# Patient Record
Sex: FEMALE | Race: WHITE | NOT HISPANIC OR LATINO | Employment: OTHER | URBAN - METROPOLITAN AREA
[De-identification: names, ages, dates, MRNs, and addresses within clinical notes are randomized per-mention and may not be internally consistent; named-entity substitution may affect disease eponyms.]

---

## 2017-10-02 ENCOUNTER — APPOINTMENT (OUTPATIENT)
Dept: LAB | Facility: HOSPITAL | Age: 72
End: 2017-10-02
Attending: UROLOGY
Payer: MEDICARE

## 2017-10-02 ENCOUNTER — ALLSCRIPTS OFFICE VISIT (OUTPATIENT)
Dept: OTHER | Facility: OTHER | Age: 72
End: 2017-10-02

## 2017-10-02 ENCOUNTER — TRANSCRIBE ORDERS (OUTPATIENT)
Dept: LAB | Facility: HOSPITAL | Age: 72
End: 2017-10-02

## 2017-10-02 DIAGNOSIS — C67.4 MALIGNANT NEOPLASM OF POSTERIOR WALL OF URINARY BLADDER (HCC): Primary | ICD-10-CM

## 2017-10-02 LAB
BILIRUB UR QL STRIP: NEGATIVE
CLARITY UR: NORMAL
COLOR UR: YELLOW
GLUCOSE (HISTORICAL): NEGATIVE
HGB UR QL STRIP.AUTO: NEGATIVE
KETONES UR STRIP-MCNC: NEGATIVE MG/DL
LEUKOCYTE ESTERASE UR QL STRIP: NORMAL
NITRITE UR QL STRIP: NEGATIVE
PH UR STRIP.AUTO: 7 [PH]
PROT UR STRIP-MCNC: NEGATIVE MG/DL
SP GR UR STRIP.AUTO: < = 1.005
UROBILINOGEN UR QL STRIP.AUTO: 0.2

## 2017-10-02 PROCEDURE — 88112 CYTOPATH CELL ENHANCE TECH: CPT

## 2017-10-06 ENCOUNTER — GENERIC CONVERSION - ENCOUNTER (OUTPATIENT)
Dept: OTHER | Facility: OTHER | Age: 72
End: 2017-10-06

## 2018-01-17 NOTE — RESULT NOTES
Verified Results  (1) URINE CYTOLOGY [id number] 08:10PM Hilary Amend     Test Name Result Flag Reference   LAB AP CASE REPORT (Report)     Non-gynecologic Cytology             Case: [id number]                  Authorizing Provider: Thaddeus Dancer, MD   Collected:      10/02/2017 2010        Ordering Location:   [address] [place] [address]   Received:      10/02/2017 2038                    [facility]                              Pathologist:      Bob Bullard MD                             Specimen:  Urine, Other   LAB AP CYTO FINAL DIAGNOSIS (Report)     A  Urine, Other, :  Negative for high grade urothelial carcinoma ([address]) - see comment  Benign urothelial cells  Benign squamous cells  Satisfactory for evaluation  Comment: The above diagnostic category is from the recently published   book, [facility] [place] [facility] Urinary Cytology, and is in keeping   with the ongoing effort for utilization of standardized diagnostic   terminology in urine cytology  *    *[facility] [place] [facility] Urinary Cytology  Nadeen Rosales; 2016  Electronically signed by Bob Bullard MD on 10/4/2017 at 9:27 AM   LAB AP GROSS DESCRIPTION      A  Urine, Other, : 50ml, yellow, clear, received in [facility]   LAB AP NONGYN ADDITIONAL INFORMATION (Report)     [employer] [place]'s FDA approved ,  and ThinPrep Imaging System are   utilized with strict adherence to the 's instruction manual to   prepare gynecologic and non-gynecologic cytology specimens for the   production of ThinPrep slides as well as for gynecologic ThinPrep imaging  These processes have been validated by our laboratory and/or by the     These tests were developed and their performance characteristics   determined by [facility] [address] [place] [address] or [employer]  They may not be cleared or approved by the [place]  Food and   Drug Administration   The FDA has determined that such clearance or   approval is not necessary  These tests are used for clinical purposes  They should not be regarded as investigational or for research  This   laboratory has been approved by CLIA 88, designated as a high-complexity   laboratory and is qualified to perform these tests     LAB AP NONGYN NOTE      Interpretation performed at [facility], [address] [place] [address]   [id number]

## 2018-01-22 VITALS
SYSTOLIC BLOOD PRESSURE: 144 MMHG | WEIGHT: 178 LBS | DIASTOLIC BLOOD PRESSURE: 82 MMHG | BODY MASS INDEX: 29.66 KG/M2 | HEIGHT: 65 IN

## 2018-10-10 ENCOUNTER — PROCEDURE VISIT (OUTPATIENT)
Dept: UROLOGY | Facility: MEDICAL CENTER | Age: 73
End: 2018-10-10
Payer: MEDICARE

## 2018-10-10 VITALS
HEIGHT: 65 IN | WEIGHT: 171 LBS | BODY MASS INDEX: 28.49 KG/M2 | DIASTOLIC BLOOD PRESSURE: 74 MMHG | SYSTOLIC BLOOD PRESSURE: 128 MMHG

## 2018-10-10 DIAGNOSIS — Z85.51 HISTORY OF MALIGNANT NEOPLASM OF BLADDER: Primary | ICD-10-CM

## 2018-10-10 PROCEDURE — 52000 CYSTOURETHROSCOPY: CPT | Performed by: UROLOGY

## 2018-10-10 RX ORDER — METOPROLOL SUCCINATE 25 MG/1
25 TABLET, EXTENDED RELEASE ORAL DAILY
Refills: 3 | COMMUNITY
Start: 2018-08-21

## 2018-10-10 RX ORDER — ATORVASTATIN CALCIUM 10 MG/1
TABLET, FILM COATED ORAL DAILY
Refills: 1 | COMMUNITY
Start: 2018-07-17 | End: 2019-10-15 | Stop reason: SDDI

## 2018-10-10 RX ORDER — ENALAPRIL MALEATE 10 MG/1
20 TABLET ORAL DAILY
COMMUNITY

## 2018-10-10 RX ORDER — BIOTIN 1 MG
TABLET ORAL DAILY
COMMUNITY

## 2018-10-10 RX ORDER — LEVOTHYROXINE SODIUM 0.05 MG/1
50 TABLET ORAL DAILY
Refills: 3 | COMMUNITY
Start: 2018-07-31

## 2018-10-10 NOTE — ASSESSMENT & PLAN NOTE
The patient's last tumor was 5 years ago  She has no recurrence  She will have annual cystoscopies for the next 5 years

## 2018-10-10 NOTE — PROGRESS NOTES
Procedures  FEMALE FLEXIBLE CYSTOSCOPY    Preoperative diagnosis:  History of bladder cancer    Postoperative diagnosis:   No recurrence      Operation:   cystoscopy    Surgeon:  Garrick Pelletier MD, FACS    Anesthesia:  Local    Procedure:  Patient was brought to the cystoscopy room and positively identified  The patient was prepped and draped in sterile fashion  1% xylocaine jelly was instilled into the urethra  The cystoscope was inserted  Findings are as follows:    Urethra:normal   The bladder neck is normal  Ureteral orifices are in normal  position  The mucosa is  normal    There is  no trabeculation  The cystoscope was removed  The patient tolerated the procedure well  Following additional consultation to review the findings with the patient,s he was discharged from the office in satisfactory condition  Impression:  Normal exam    Plan:   Repeat cystoscopy in 1 year

## 2019-10-15 ENCOUNTER — PROCEDURE VISIT (OUTPATIENT)
Dept: UROLOGY | Facility: MEDICAL CENTER | Age: 74
End: 2019-10-15
Payer: MEDICARE

## 2019-10-15 VITALS
DIASTOLIC BLOOD PRESSURE: 80 MMHG | SYSTOLIC BLOOD PRESSURE: 128 MMHG | HEART RATE: 87 BPM | HEIGHT: 65 IN | WEIGHT: 174 LBS | BODY MASS INDEX: 28.99 KG/M2

## 2019-10-15 DIAGNOSIS — Z85.51 HISTORY OF MALIGNANT NEOPLASM OF BLADDER: ICD-10-CM

## 2019-10-15 PROCEDURE — 52000 CYSTOURETHROSCOPY: CPT | Performed by: UROLOGY

## 2019-10-15 NOTE — PROGRESS NOTES
Procedures  FEMALE RIGID CYSTOSCOPY    Preoperative diagnosis:  History of bladder cancer in 2013    Postoperative diagnosis:  Normal cystoscopy  No recurrence  Operation:  Rigid cystoscopy    Surgeon:  Regis Woodruff MD, FACS    Anesthesia:  Local    Procedure:  Patient was brought to the cystoscopy room and positively identified  The patient was prepped and draped in sterile fashion  1% xylocaine jelly was instilled into the urethra  The cystoscope was inserted  Findings are as follows:    Urethra:normal   The bladder neck is normal  Ureteral orifices are in normal  position  The mucosa is normal    There is no trabeculation  No recurrent neoplasm    The cystoscope was removed  The patient tolerated the procedure well  Following additional consultation to review the findings with the patient,s he was discharged from the office in satisfactory condition  Impression:  Normal exam    Plan:  Repeat cystoscopy in 1 year

## 2019-10-15 NOTE — LETTER
October 15, 2019     [facility]  [address]  [id number]    Patient: Kate Kc   YOB: 1945   Date of Visit: 10/15/2019       Dear Dr Isauro Dahl: Thank you for referring Joi Gastelum to me for evaluation  Below are my notes for this consultation  If you have questions, please do not hesitate to call me  I look forward to following your patient along with you  Sincerely,        Danny Ortega MD        CC: No Recipients  Danny Ortega MD  10/15/2019 11:01 AM  Incomplete  Procedures  FEMALE RIGID CYSTOSCOPY    Preoperative diagnosis:  History of bladder cancer in 2013    Postoperative diagnosis:  Normal cystoscopy  No recurrence  Operation:  Rigid cystoscopy    Surgeon:  Goyo Mondragon MD, FACS    Anesthesia:  Local    Procedure:  Patient was brought to the cystoscopy room and positively identified  The patient was prepped and draped in sterile fashion  1% xylocaine jelly was instilled into the urethra  The cystoscope was inserted  Findings are as follows:    Urethra:normal   The bladder neck is normal  Ureteral orifices are in normal  position  The mucosa is normal    There is no trabeculation  No recurrent neoplasm    The cystoscope was removed  The patient tolerated the procedure well  Following additional consultation to review the findings with the patient,s he was discharged from the office in satisfactory condition  Impression:  Normal exam    Plan:  Repeat cystoscopy in 1 year

## 2020-10-20 ENCOUNTER — PROCEDURE VISIT (OUTPATIENT)
Dept: UROLOGY | Facility: MEDICAL CENTER | Age: 75
End: 2020-10-20
Payer: MEDICARE

## 2020-10-20 VITALS
TEMPERATURE: 97.5 F | HEART RATE: 97 BPM | HEIGHT: 64 IN | DIASTOLIC BLOOD PRESSURE: 86 MMHG | WEIGHT: 176 LBS | BODY MASS INDEX: 30.05 KG/M2 | SYSTOLIC BLOOD PRESSURE: 144 MMHG

## 2020-10-20 DIAGNOSIS — Z85.51 HISTORY OF MALIGNANT NEOPLASM OF BLADDER: Primary | ICD-10-CM

## 2020-10-20 PROCEDURE — 52000 CYSTOURETHROSCOPY: CPT | Performed by: UROLOGY

## 2022-01-25 ENCOUNTER — PROCEDURE VISIT (OUTPATIENT)
Dept: UROLOGY | Facility: MEDICAL CENTER | Age: 77
End: 2022-01-25
Payer: MEDICARE

## 2022-01-25 VITALS
BODY MASS INDEX: 25.83 KG/M2 | DIASTOLIC BLOOD PRESSURE: 100 MMHG | WEIGHT: 155 LBS | HEART RATE: 87 BPM | HEIGHT: 65 IN | SYSTOLIC BLOOD PRESSURE: 160 MMHG

## 2022-01-25 DIAGNOSIS — Z85.51 HISTORY OF MALIGNANT NEOPLASM OF BLADDER: Primary | ICD-10-CM

## 2022-01-25 PROCEDURE — 99213 OFFICE O/P EST LOW 20 MIN: CPT | Performed by: UROLOGY

## 2022-01-25 PROCEDURE — 52000 CYSTOURETHROSCOPY: CPT | Performed by: UROLOGY

## 2022-01-25 RX ORDER — MECLIZINE HYDROCHLORIDE 25 MG/1
25 TABLET ORAL 3 TIMES DAILY PRN
COMMUNITY
Start: 2021-04-01 | End: 2022-04-01

## 2022-01-25 RX ORDER — ROSUVASTATIN CALCIUM 10 MG/1
TABLET, COATED ORAL
COMMUNITY
Start: 2022-01-15

## 2022-01-25 RX ORDER — ROSUVASTATIN CALCIUM 10 MG/1
10 TABLET, COATED ORAL DAILY
COMMUNITY
Start: 2021-04-20 | End: 2022-04-20

## 2022-01-25 NOTE — PROGRESS NOTES
HISTORY:    Superficial bladder cancer in 2013, no recurrence since then  No voiding symptoms at all, good stream and control, no hematuria         ASSESSMENT / PLAN:    Cysto normal  I recommend repeat cysto in one year from now, that will be 10 years, and likely would not need to have it done after that    The following portions of the patient's history were reviewed and updated as appropriate: allergies, current medications, past family history, past medical history, past social history, past surgical history and problem list       Review of Systems   All other systems reviewed and are negative  Objective:     Physical Exam  Constitutional:       General: She is not in acute distress  Appearance: She is well-developed  She is not diaphoretic  HENT:      Head: Normocephalic and atraumatic  Eyes:      General: No scleral icterus  Pulmonary:      Effort: Pulmonary effort is normal    Skin:     Coloration: Skin is not pale  Neurological:      Mental Status: She is alert and oriented to person, place, and time  Psychiatric:         Behavior: Behavior normal          Thought Content: Thought content normal          Judgment: Judgment normal             Cystoscopy     Date/Time 1/25/2022 1:52 PM     Performed by  Tawny Stinson MD     Authorized by Tawny Stinson MD          Procedure Details:  Procedure type: cystoscopy    Patient tolerance: Patient tolerated the procedure well with no immediate complications    Additional Procedure Details:     Patient presents for cystoscopy  I described the procedure, answered any questions, and we discussed potential risks and complications  Patient expressed understanding, and signed an informed consent document  The patient was carefully placed in lithotomy position on the examining table  The urethra was prepped with sterile disinfectant    The 15 Maltese flexible cystoscope was passed in the urethra with the following findings:    Urethra:  No strictures     Bladder:  Mild trabeculation, no tumors    Residual urine estimated to be minimal     The patient tolerated the procedure well and was escorted from the examining table  No results found for: PSA]  No results found for: BUN  No results found for: CREATININE  No components found for: CBC      Patient Active Problem List   Diagnosis    History of malignant neoplasm of bladder        Diagnoses and all orders for this visit:    History of malignant neoplasm of bladder  -     POCT urine dip auto non-scope    Other orders  -     meclizine (ANTIVERT) 25 mg tablet; Take 25 mg by mouth Three times daily as needed  -     rosuvastatin (CRESTOR) 10 MG tablet; Take 10 mg by mouth daily  -     rosuvastatin (CRESTOR) 10 MG tablet;  (Patient not taking: Reported on 1/25/2022 )           Patient ID: Dionna Phelps is a 67 y o  female        Current Outpatient Medications:     aspirin 81 MG tablet, Take by mouth daily, Disp: , Rfl:     Bilberry, Vaccinium myrtillus, (BILBERRY PO), Take by mouth daily, Disp: , Rfl:     Cholecalciferol (VITAMIN D3) 1000 units CAPS, Take by mouth daily, Disp: , Rfl:     enalapril (VASOTEC) 10 mg tablet, Take 20 mg by mouth daily , Disp: , Rfl:     Flaxseed, Linseed, (FLAXSEED OIL PO), Take by mouth, Disp: , Rfl:     levothyroxine 50 mcg tablet, Take 50 mcg by mouth daily, Disp: , Rfl: 3    meclizine (ANTIVERT) 25 mg tablet, Take 25 mg by mouth Three times daily as needed, Disp: , Rfl:     metoprolol succinate (TOPROL-XL) 25 mg 24 hr tablet, Take 25 mg by mouth daily, Disp: , Rfl: 3    Multiple Minerals (CALCIUM/MAGNESIUM/ZINC PO), Take by mouth, Disp: , Rfl:     rosuvastatin (CRESTOR) 10 MG tablet, Take 10 mg by mouth daily, Disp: , Rfl:     Multiple Vitamins-Minerals (DAILY MULTIVITAMIN PO), every other day (Patient not taking: Reported on 1/25/2022 ), Disp: , Rfl:     rosuvastatin (CRESTOR) 10 MG tablet, , Disp: , Rfl:     Past Medical History:   Diagnosis Date    Arthritis     Chronic neck pain     Gross hematuria     Hypertension     Incomplete bladder emptying     Malignant neoplasm of posterior wall of urinary bladder (HCC)     Microhematuria     Nocturia     Post-void dribbling     Skin cancer     Urinary frequency     Weak urinary stream        Past Surgical History:   Procedure Laterality Date    APPENDECTOMY  1948    BLADDER FULGURATION  2013    < 0 05 cm    CATARACT EXTRACTION Right 2015    CYSTOSCOPY  2013    LUNG BIOPSY  2005    REPLACEMENT TOTAL KNEE  2006   [id number]       Social History

## 2022-09-29 ENCOUNTER — OFFICE VISIT (OUTPATIENT)
Dept: UROLOGY | Facility: MEDICAL CENTER | Age: 77
End: 2022-09-29
Payer: MEDICARE

## 2022-09-29 VITALS
SYSTOLIC BLOOD PRESSURE: 122 MMHG | BODY MASS INDEX: 27.79 KG/M2 | WEIGHT: 166.8 LBS | HEIGHT: 65 IN | DIASTOLIC BLOOD PRESSURE: 80 MMHG | HEART RATE: 101 BPM

## 2022-09-29 DIAGNOSIS — Z85.51 HISTORY OF MALIGNANT NEOPLASM OF BLADDER: ICD-10-CM

## 2022-09-29 DIAGNOSIS — R31.0 GROSS HEMATURIA: Primary | ICD-10-CM

## 2022-09-29 LAB
SL AMB  POCT GLUCOSE, UA: ABNORMAL
SL AMB LEUKOCYTE ESTERASE,UA: ABNORMAL
SL AMB POCT BILIRUBIN,UA: ABNORMAL
SL AMB POCT BLOOD,UA: ABNORMAL
SL AMB POCT CLARITY,UA: CLEAR
SL AMB POCT COLOR,UA: YELLOW
SL AMB POCT KETONES,UA: ABNORMAL
SL AMB POCT NITRITE,UA: ABNORMAL
SL AMB POCT PH,UA: 5.5
SL AMB POCT SPECIFIC GRAVITY,UA: 1.01
SL AMB POCT URINE PROTEIN: ABNORMAL
SL AMB POCT UROBILINOGEN: 0.2

## 2022-09-29 PROCEDURE — 99213 OFFICE O/P EST LOW 20 MIN: CPT | Performed by: UROLOGY

## 2022-09-29 PROCEDURE — 81003 URINALYSIS AUTO W/O SCOPE: CPT | Performed by: UROLOGY

## 2022-09-29 PROCEDURE — 52000 CYSTOURETHROSCOPY: CPT | Performed by: UROLOGY

## 2022-09-29 NOTE — PROGRESS NOTES
HISTORY:    Patient with history of bladder cancer in 2013 and cysto so the negative to date     Recent problem is probable UTI one month ago  Couple episodes of dysuria, and she saw two drops of blood in the toilet bowl at the end of the stream     Urine at that time showed numerous white cells, red cells, and nitrites  However culture was negative  Given Macrobid with resolution of symptoms    She is quite worried about recurrence of bladder cancer because of the blood         ASSESSMENT / PLAN:    1  I think it is quite likely she did have UTI, despite the negative culture  That was likely the cause of blood also    2  Cysto today normal, she is quite relieved that there are no recurrent tumors    3  We have her on a yearly cysto surveillance, that will next be done one year from now  4  CT of kidneys to complete evaluation for blood in the urine      The following portions of the patient's history were reviewed and updated as appropriate: allergies, current medications, past family history, past medical history, past social history, past surgical history and problem list     Review of Systems   All other systems reviewed and are negative  Objective:     Cystoscopy     Date/Time 9/29/2022 4:24 PM     Performed by  Leon Azar MD     Authorized by Leon Azar MD          Procedure Details:  Procedure type: cystoscopy    Patient tolerance: Patient tolerated the procedure well with no immediate complications    Additional Procedure Details:     Patient presents for cystoscopy  I described the procedure, answered any questions, and we discussed potential risks and complications  Patient expressed understanding, and signed an informed consent document  The patient was carefully placed in lithotomy position on the examining table  The urethra was prepped with sterile disinfectant    The 15 U[place]bek flexible cystoscope was passed in the urethra with the following findings:    Urethra:  No stenosis     Bladder:  Mild trabeculation, scar visible from initial tumor  No tumors or anything suggestive of that    Residual urine estimated to be minimal     The patient tolerated the procedure well and was escorted from the examining table  Physical Exam  Constitutional:       General: She is not in acute distress  Appearance: She is well-developed  She is not diaphoretic  HENT:      Head: Normocephalic and atraumatic  Eyes:      General: No scleral icterus  Pulmonary:      Effort: Pulmonary effort is normal    Skin:     Coloration: Skin is not pale  Neurological:      Mental Status: She is alert and oriented to person, place, and time  Psychiatric:         Behavior: Behavior normal          Thought Content: Thought content normal          Judgment: Judgment normal              No results found for: PSA]  No results found for: BUN  No results found for: CREATININE  No components found for: CBC      Patient Active Problem List   Diagnosis    History of malignant neoplasm of bladder        Diagnoses and all orders for this visit:    Gross hematuria  -     POCT urine dip auto non-scope  -     CT abdomen pelvis w wo contrast; Future    History of malignant neoplasm of bladder  -     POCT urine dip auto non-scope           Patient ID: Danilo Solitario is a 65 y o  female        Current Outpatient Medications:     aspirin 81 MG tablet, Take by mouth daily, Disp: , Rfl:     Bilberry, Vaccinium myrtillus, (BILBERRY PO), Take by mouth daily, Disp: , Rfl:     Cholecalciferol (VITAMIN D3) 1000 units CAPS, Take by mouth daily, Disp: , Rfl:     enalapril (VASOTEC) 10 mg tablet, Take 20 mg by mouth daily , Disp: , Rfl:     Flaxseed, Linseed, (FLAXSEED OIL PO), Take by mouth, Disp: , Rfl:     levothyroxine 50 mcg tablet, Take 50 mcg by mouth daily, Disp: , Rfl: 3    metoprolol succinate (TOPROL-XL) 25 mg 24 hr tablet, Take 25 mg by mouth daily, Disp: , Rfl: 3    Multiple Minerals (CALCIUM/MAGNESIUM/ZINC PO), Take by mouth, Disp: , Rfl:     rosuvastatin (CRESTOR) 10 MG tablet, Take 10 mg by mouth daily (Patient not taking: Reported on 9/29/2022), Disp: , Rfl:     Past Medical History:   Diagnosis Date    Arthritis     Chronic neck pain     Gross hematuria     Hypertension     Incomplete bladder emptying     Malignant neoplasm of posterior wall of urinary bladder (HCC)     Microhematuria     Nocturia     Post-void dribbling     Skin cancer     Urinary frequency     Weak urinary stream        Past Surgical History:   Procedure Laterality Date    APPENDECTOMY  1948    BLADDER FULGURATION  2013    < 0 05 cm    CATARACT EXTRACTION Right 2015    CYSTOSCOPY  2013    LUNG BIOPSY  2005    REPLACEMENT TOTAL KNEE  2006   [id number]       Social History

## 2022-10-07 ENCOUNTER — HOSPITAL ENCOUNTER (OUTPATIENT)
Dept: CT IMAGING | Facility: HOSPITAL | Age: 77
Discharge: HOME | End: 2022-10-07
Attending: UROLOGY
Payer: MEDICARE

## 2022-10-07 DIAGNOSIS — R31.0 GROSS HEMATURIA: ICD-10-CM

## 2022-10-07 PROCEDURE — 74178 CT ABD&PLV WO CNTR FLWD CNTR: CPT

## 2022-10-07 PROCEDURE — G1004 CDSM NDSC: HCPCS

## 2022-10-07 RX ADMIN — IOHEXOL 100 ML: 350 INJECTION, SOLUTION INTRAVENOUS at 15:54

## 2022-10-11 NOTE — RESULT ENCOUNTER NOTE
Call pt, normal results, she had hematuria and was very worried that she had recurrent bladder cancer  None seen on cysto, so this normal CT completes the evaluation, it probably was a UTI that caused the blood